# Patient Record
(demographics unavailable — no encounter records)

---

## 2024-10-08 NOTE — PLAN
[FreeTextEntry1] : Urine culture collected  Urine dip is significant for large blood and trace leukocytes  Macrobid ordered for s/s of UTI

## 2024-10-08 NOTE — DISCUSSION/SUMMARY
[FreeTextEntry1] : I spent the time noted on the day of this patient encounter preparing for, providing and documenting the above service. I have  counseled and educated the patient on the differential, workup, disease course, and treatment/management plan. Education was provided to the patient during this encounter. All questions and concerns were answered and addressed in detail.  Vangie Cook NP, FNP-BC

## 2024-10-08 NOTE — ASSESSMENT
[Levothyroxine] : The patient was instructed to take Levothyroxine on an empty stomach, separate from vitamins, and wait at least 30 minutes before eating [FreeTextEntry1] : 1. Hypothyroidism Proper intake of levothyroxine is reviewed in details, including on an empty stomach, with water only, at least one hour before taking any medications, vitamins, or supplements and three-four hours before taking iron or calcium. - adherence is reiterated - cont L-thyroxine 137 mcg qd, pending results - potential options for T3/T4 combination therapy reviewed, as well as R+B - dietary options for autoimmune response reviewed with the patient  2. Obesity - Current approaches to weight management are discussed with the patient. Suggested extensive nutritional education program. Proper dietary restrictions and exercise routines discussed. Medical weight loss therapies were reviewed with the patient. Would avoid qsymia, contrave, phentermine in view of her other meds. We discussed glp1 agonists, orlistat. Had success on Wegovy- unable to get it anywhere. will resubmit Zepbound 2.5 mg qw. R+B in details.  - refer for home sleep study  RTC 3 months, labs prior.

## 2024-10-08 NOTE — HISTORY OF PRESENT ILLNESS
[FreeTextEntry1] : 51 year female  f/u for hypothyroidism management.   *** Oct 08, 2024 ***  still unable to obtain Zepbound weight has been stable, unable to lose taking Synthroid 137 mcg no recent labs did not proceed with a sleep study yet  *** Jun 12, 2024 ***  zepbound is approved, but unable to find the medication unable to lose weight despite a strict diet snores more, poor sleep. tired throughout the day staying on Synthroid 137 mcg no recent labs  *** Mar 05, 2024 ***  doing well overall remains on Synthroid 125 mcg unable to start Wegovy d/t the shortage. Zepbound was not approved weight has been the same, unable to lose anything. Walking more. Started using weights  *** Dec 05, 2023 ***  doing well overall wegovy just became available- panning to start staying on L-thyroxine 150 mcg  *** Sep 05, 2023 ***  tried wegovy for a month- lost 10 lbs , but was not able to get it from the pharmacy staying on L-thyroxine 175 mcg qd  *** May 30, 2023 ***  under stress- going through a divorce should be on L-thyroxine 200 mcg qd, but missed some times diet is worse, but is not ready for weight loss meds   *** Feb 22, 2023 ***  last visit more than  a year ago had covid a month ago, recovered well. s/p left shoulder sx taking L-thyroxine 200 mcg only 6 days a week was not taking any meds for about a week in early january gaining weight, diet is much worse  *** Oct 18, 2021 ***  feels ok, struggles with the weight . on L-thyroxine 200 mcg last TSH- 0.13, FT4- 1.9 24h UFC- 22.2  *** May 17, 2021 ***  last visit in 2019 by mistake, was taking synthroid 150 mcg twice a week only for 4-5 months. Felt extremely tired, gained weight. resume synthroid 175 mcg daily about 3 months ago- felt much better since then finished grad school . no repeat labs yet 1mg ONDST is not done  *** Sep 24, 2019 ***  feels ell. denies new c/o on L-thyroxine 175 mcg qd (tirosint is expensive) TSH- 0.61, FT4- 1.6 no ONDST yet  HPI: Ms. SALINAS  was diagnosed with Hashimoto's' hypothyroidism at the age of 30.  Most recently on 200 mcg 6 days a week of generic L-thyroxine. Her dose was decreased about a week ago, but she did not start it yet. She was previously on synthroid brand, and always felt better on it, but had to stop because financial reason. Denies family history of thyroid cancer or history of radiation exposure to head and neck area in a childhood.  TSH- 0.07, FT4- 2.2 Thyroid US (1/11/19)- atrophic thyroid without discrete nodules

## 2024-10-08 NOTE — HISTORY OF PRESENT ILLNESS
[FreeTextEntry1] : Pt is a 51-year-old who presents for urinary frequency, urgency, and incomplete emptying for 3 days. Pt denies vaginal complaints. Pt denies SOB, palpitations, chills, fever, and back pain. LMP 10/6/2024.

## 2024-11-19 NOTE — ADDENDUM
Return to the emergency department with any new or worsening symptoms. In the meantime please keep your appointment with your OB/GYN doctor on Tuesday. Please take your insulin when you get home as well. [FreeTextEntry1] : This note was written by Sanjana Israel, acting as the  for Dr. Steel. This note accurately reflects the work and decisions made by Dr. Steel.

## 2024-11-19 NOTE — HISTORY OF PRESENT ILLNESS
[Vaginal Wall Prolapse] : no [Rectal Prolapse] : no [Unable To Restrain Bowel Movement] : no [Urinary Frequency] : no [Feelings Of Urinary Urgency] : mild [Urinary Tract Infection] : mild [Constipation Obstructed Defecation] : no [] : no [Pelvic Pain] : no [Vaginal Pain] : no [FreeTextEntry1] : NAN is a 51 year female who presents for f/u on OAB. She is s/p sling cysto with me on 02/07/2024. Last seen in office on 03/26/2024, continuing Myrbetriq 50mg. She reports she was doing well on the myrbetriq; however it isn't covered by her insurance so she stopped taking it about 6 weeks ago. Frequency and urgency were improved and have worsened since stopping the medication. Noctura x1. Treated for a UTI last month. Denies UTI symptoms today. Rare leakage with TALI.   UA w/ micro 10/08/2024 - Protein 30mg/dL, Large blood, Moderate leukocyte esterase, 12 WBCs, 12 RBCs, Many bacteria  Positive Urine Cx 10/08/2024 - >100,000 CFU/ml Proteus mirabilis  TVUS 06/17/2024 - 2.1 cm complex nabothian cyst. 6 mm subendometrial cystic change. Small dominant follicles versus simple cysts in each ovary. 2.4 cm right paraovarian mass, nonspecific. This may simply represent a pedunculated fibroid, however follow-up MRI for clarification.  MRI Pelvis 07/02/2024 - No evidence of paraovarian mass. Exophytic 2.1 cm right ovarian hemorrhagic/proteinaceous cyst.

## 2024-11-19 NOTE — DISCUSSION/SUMMARY
[FreeTextEntry1] : NAN is a 51 year female who presents for f/u on OAB. She is s/p sling cysto with me on 02/07/2024. Reports improvement in symptoms with Myrbetriq but not covered by her insurance.   [] Myrbetriq 50mg sent through [facility]- rx sent risks/benefits discussed  f/u 6 months with NP  All questions answered.

## 2024-11-19 NOTE — PHYSICAL EXAM
[Chaperone Present] : A chaperone was present in the examining room during all aspects of the physical examination [No Acute Distress] : in no acute distress [Well developed] : well developed [Well Nourished] : ~L well nourished [Oriented x3] : oriented to person, place, and time [06815] : A chaperone was present during the pelvic exam. [No Edema] : ~T edema was not present [Warm and Dry] : was warm and dry to touch [Labia Majora] : were normal [Labia Minora] : were normal [Normal Appearance] : general appearance was normal [Normal] : was normal [FreeTextEntry2] :  Sanjana Israel [Cough] : no cough [Tenderness] : ~T no ~M abdominal tenderness observed [Distended] : not distended [FreeTextEntry4] : no mesh exposure, graft nontender

## 2024-11-19 NOTE — ADDENDUM
[FreeTextEntry1] : This note was written by Sanjana Israel, acting as the  for Dr. Steel. This note accurately reflects the work and decisions made by Dr. Steel.

## 2025-02-04 NOTE — HISTORY OF PRESENT ILLNESS
[FreeTextEntry1] : 51 year female  f/u for hypothyroidism management.   *** Feb 04, 2025 ***  on Synthroid 137 mcg, but has been off for almost a month b/o insurance issues. Resumed about 10 days ago, Wegovy 1 mg qw much slower digestion, more frequent vomiting after heavy meals doing better with smoothies has not gone for sleep studies yet no recent labs  *** Oct 08, 2024 ***  still unable to obtain Zepbound weight has been stable, unable to lose taking Synthroid 137 mcg no recent labs did not proceed with a sleep study yet  *** Jun 12, 2024 ***  zepbound is approved, but unable to find the medication unable to lose weight despite a strict diet snores more, poor sleep. tired throughout the day staying on Synthroid 137 mcg no recent labs  *** Mar 05, 2024 ***  doing well overall remains on Synthroid 125 mcg unable to start Wegovy d/t the shortage. Zepbound was not approved weight has been the same, unable to lose anything. Walking more. Started using weights  *** Dec 05, 2023 ***  doing well overall wegovy just became available- panning to start staying on L-thyroxine 150 mcg  *** Sep 05, 2023 ***  tried wegovy for a month- lost 10 lbs , but was not able to get it from the pharmacy staying on L-thyroxine 175 mcg qd  *** May 30, 2023 ***  under stress- going through a divorce should be on L-thyroxine 200 mcg qd, but missed some times diet is worse, but is not ready for weight loss meds   *** Feb 22, 2023 ***  last visit more than  a year ago had covid a month ago, recovered well. s/p left shoulder sx taking L-thyroxine 200 mcg only 6 days a week was not taking any meds for about a week in early january gaining weight, diet is much worse  *** Oct 18, 2021 ***  feels ok, struggles with the weight . on L-thyroxine 200 mcg last TSH- 0.13, FT4- 1.9 24h UFC- 22.2  *** May 17, 2021 ***  last visit in 2019 by mistake, was taking synthroid 150 mcg twice a week only for 4-5 months. Felt extremely tired, gained weight. resume synthroid 175 mcg daily about 3 months ago- felt much better since then finished grad school . no repeat labs yet 1mg ONDST is not done  *** Sep 24, 2019 ***  feels ell. denies new c/o on L-thyroxine 175 mcg qd (tirosint is expensive) TSH- 0.61, FT4- 1.6 no ONDST yet  HPI: Ms. SALINAS  was diagnosed with Hashimoto's' hypothyroidism at the age of 30.  Most recently on 200 mcg 6 days a week of generic L-thyroxine. Her dose was decreased about a week ago, but she did not start it yet. She was previously on synthroid brand, and always felt better on it, but had to stop because financial reason. Denies family history of thyroid cancer or history of radiation exposure to head and neck area in a childhood.  TSH- 0.07, FT4- 2.2 Thyroid US (1/11/19)- atrophic thyroid without discrete nodules

## 2025-02-04 NOTE — HISTORY OF PRESENT ILLNESS
[FreeTextEntry1] : 50 yo here for follow up and med refills stopped bupropion and gabapentin, feeling better since divorce last summer; states has been trying to cut down on her psych meds follows w/ therapist and endo regularly pt is also a therapist for [employer]

## 2025-02-04 NOTE — ASSESSMENT
[Levothyroxine] : The patient was instructed to take Levothyroxine on an empty stomach, separate from vitamins, and wait at least 30 minutes before eating [FreeTextEntry1] : 1. Hypothyroidism Proper intake of levothyroxine is reviewed in details, including on an empty stomach, with water only, at least one hour before taking any medications, vitamins, or supplements and three-four hours before taking iron or calcium. - adherence is reiterated - resume L-thyroxine 137 mcg qd and retest in 1 month - potential options for T3/T4 combination therapy reviewed, as well as R+B - dietary options for autoimmune response reviewed with the patient  2. Obesity - Current approaches to weight management are discussed with the patient. Suggested extensive nutritional education program. Proper dietary restrictions and exercise routines discussed. Medical weight loss therapies were reviewed with the patient. Would avoid qsymia, contrave, phentermine in view of her other meds. ok on Wegovy, but with more GI issues- will resubmit Zepbound 2.5 mg qw. R+B in details.  - refer for home sleep study  RTC 3 months

## 2025-06-08 NOTE — PHYSICAL EXAM
[No Acute Distress] : no acute distress [Well-Appearing] : well-appearing [Normal Sclera/Conjunctiva] : normal sclera/conjunctiva [PERRL] : pupils equal round and reactive to light [EOMI] : extraocular movements intact [Normal Outer Ear/Nose] : the outer ears and nose were normal in appearance [Normal Oropharynx] : the oropharynx was normal [No JVD] : no jugular venous distention [No Lymphadenopathy] : no lymphadenopathy [Supple] : supple [Thyroid Normal, No Nodules] : the thyroid was normal and there were no nodules present [No Respiratory Distress] : no respiratory distress  [No Accessory Muscle Use] : no accessory muscle use [Clear to Auscultation] : lungs were clear to auscultation bilaterally [Normal Rate] : normal rate  [Regular Rhythm] : with a regular rhythm [Normal S1, S2] : normal S1 and S2 [No Murmur] : no murmur heard [No Abdominal Bruit] : a ~M bruit was not heard ~T in the abdomen [No Varicosities] : no varicosities [Pedal Pulses Present] : the pedal pulses are present [No Edema] : there was no peripheral edema [No Palpable Aorta] : no palpable aorta [No Extremity Clubbing/Cyanosis] : no extremity clubbing/cyanosis [Normal Appearance] : normal in appearance [No Nipple Discharge] : no nipple discharge [No Axillary Lymphadenopathy] : no axillary lymphadenopathy [Soft] : abdomen soft [Non Tender] : non-tender [Non-distended] : non-distended [No Masses] : no abdominal mass palpated [No HSM] : no HSM [Normal Bowel Sounds] : normal bowel sounds [Normal Supraclavicular Nodes] : no supraclavicular lymphadenopathy [Normal Axillary Nodes] : no axillary lymphadenopathy [Normal Posterior Cervical Nodes] : no posterior cervical lymphadenopathy [Normal Anterior Cervical Nodes] : no anterior cervical lymphadenopathy [No CVA Tenderness] : no CVA  tenderness [No Spinal Tenderness] : no spinal tenderness [No Joint Swelling] : no joint swelling [Grossly Normal Strength/Tone] : grossly normal strength/tone [No Rash] : no rash [Coordination Grossly Intact] : coordination grossly intact [No Focal Deficits] : no focal deficits [Normal Gait] : normal gait [Normal Affect] : the affect was normal [Alert and Oriented x3] : oriented to person, place, and time [Normal Insight/Judgement] : insight and judgment were intact [de-identified] : overwgt [de-identified] : sun damage skin

## 2025-06-08 NOTE — HEALTH RISK ASSESSMENT
[Good] : ~his/her~ current health as good [Fair] :  ~his/her~ mood as fair [4 or more  times a week (4 pts)] : 4 or more  times a week (4 points) [1 or 2 (0 pts)] : 1 or 2 (0 points) [Never (0 pts)] : Never (0 points) [No] : In the past 12 months have you used drugs other than those required for medical reasons? No [None] : Patient does not have any barriers to medication adherence [Yes] : Reviewed medication list for presence of high-risk medications. [Benzodiazepines] : benzodiazepines [Current] : Current [20 or more] : 20 or more [HIV test declined] : HIV test declined [Hepatitis C test declined] : Hepatitis C test declined [Alone] : lives alone [Employed] : employed [Single] : single [] :  [# Of Children ___] : has [unfilled] children [Feels Safe at Home] : Feels safe at home [Fully functional (bathing, dressing, toileting, transferring, walking, feeding)] : Fully functional (bathing, dressing, toileting, transferring, walking, feeding) [Fully functional (using the telephone, shopping, preparing meals, housekeeping, doing laundry, using] : Fully functional and needs no help or supervision to perform IADLs (using the telephone, shopping, preparing meals, housekeeping, doing laundry, using transportation, managing medications and managing finances) [Reports changes in hearing] : Reports changes in hearing [de-identified] : feels like she's more active, gardening [de-identified] : smoothie, protein powder, reduced portions [NO] : No [Change in mental status noted] : No change in mental status noted [Language] : denies difficulty with language [Handling Complex Tasks] : denies difficulty handling complex tasks [Sexually Active] : not sexually active [Reports changes in vision] : Reports no changes in vision [Reports changes in dental health] : Reports no changes in dental health [MammogramDate] : 07/24 [PapSmearDate] : 07/24 [BoneDensityDate] : 06/24 [ColonoscopyDate] : 10/19

## 2025-06-08 NOTE — HEALTH RISK ASSESSMENT
[Good] : ~his/her~ current health as good [Fair] :  ~his/her~ mood as fair [4 or more  times a week (4 pts)] : 4 or more  times a week (4 points) [1 or 2 (0 pts)] : 1 or 2 (0 points) [Never (0 pts)] : Never (0 points) [No] : In the past 12 months have you used drugs other than those required for medical reasons? No [None] : Patient does not have any barriers to medication adherence [Yes] : Reviewed medication list for presence of high-risk medications. [Benzodiazepines] : benzodiazepines [Current] : Current [20 or more] : 20 or more [HIV test declined] : HIV test declined [Hepatitis C test declined] : Hepatitis C test declined [Alone] : lives alone [Employed] : employed [Single] : single [] :  [# Of Children ___] : has [unfilled] children [Feels Safe at Home] : Feels safe at home [Fully functional (bathing, dressing, toileting, transferring, walking, feeding)] : Fully functional (bathing, dressing, toileting, transferring, walking, feeding) [Fully functional (using the telephone, shopping, preparing meals, housekeeping, doing laundry, using] : Fully functional and needs no help or supervision to perform IADLs (using the telephone, shopping, preparing meals, housekeeping, doing laundry, using transportation, managing medications and managing finances) [Reports changes in hearing] : Reports changes in hearing [de-identified] : feels like she's more active, gardening [de-identified] : smoothie, protein powder, reduced portions [NO] : No [Change in mental status noted] : No change in mental status noted [Language] : denies difficulty with language [Handling Complex Tasks] : denies difficulty handling complex tasks [Sexually Active] : not sexually active [Reports changes in vision] : Reports no changes in vision [Reports changes in dental health] : Reports no changes in dental health [MammogramDate] : 07/24 [PapSmearDate] : 07/24 [BoneDensityDate] : 06/24 [ColonoscopyDate] : 10/19

## 2025-06-08 NOTE — PHYSICAL EXAM
[No Acute Distress] : no acute distress [Well-Appearing] : well-appearing [Normal Sclera/Conjunctiva] : normal sclera/conjunctiva [PERRL] : pupils equal round and reactive to light [EOMI] : extraocular movements intact [Normal Outer Ear/Nose] : the outer ears and nose were normal in appearance [Normal Oropharynx] : the oropharynx was normal [No JVD] : no jugular venous distention [No Lymphadenopathy] : no lymphadenopathy [Supple] : supple [Thyroid Normal, No Nodules] : the thyroid was normal and there were no nodules present [No Respiratory Distress] : no respiratory distress  [No Accessory Muscle Use] : no accessory muscle use [Clear to Auscultation] : lungs were clear to auscultation bilaterally [Normal Rate] : normal rate  [Regular Rhythm] : with a regular rhythm [Normal S1, S2] : normal S1 and S2 [No Murmur] : no murmur heard [No Abdominal Bruit] : a ~M bruit was not heard ~T in the abdomen [No Varicosities] : no varicosities [Pedal Pulses Present] : the pedal pulses are present [No Edema] : there was no peripheral edema [No Palpable Aorta] : no palpable aorta [No Extremity Clubbing/Cyanosis] : no extremity clubbing/cyanosis [Normal Appearance] : normal in appearance [No Nipple Discharge] : no nipple discharge [No Axillary Lymphadenopathy] : no axillary lymphadenopathy [Soft] : abdomen soft [Non Tender] : non-tender [Non-distended] : non-distended [No Masses] : no abdominal mass palpated [No HSM] : no HSM [Normal Bowel Sounds] : normal bowel sounds [Normal Supraclavicular Nodes] : no supraclavicular lymphadenopathy [Normal Axillary Nodes] : no axillary lymphadenopathy [Normal Posterior Cervical Nodes] : no posterior cervical lymphadenopathy [Normal Anterior Cervical Nodes] : no anterior cervical lymphadenopathy [No CVA Tenderness] : no CVA  tenderness [No Spinal Tenderness] : no spinal tenderness [No Joint Swelling] : no joint swelling [Grossly Normal Strength/Tone] : grossly normal strength/tone [No Rash] : no rash [Coordination Grossly Intact] : coordination grossly intact [No Focal Deficits] : no focal deficits [Normal Gait] : normal gait [Normal Affect] : the affect was normal [Alert and Oriented x3] : oriented to person, place, and time [Normal Insight/Judgement] : insight and judgment were intact [de-identified] : overwgt [de-identified] : sun damage skin